# Patient Record
(demographics unavailable — no encounter records)

---

## 2024-10-22 NOTE — HEALTH RISK ASSESSMENT
[Patient reported PAP Smear was abnormal] : Patient reported PAP Smear was abnormal [Never] : Never [None] : None [With Family] : lives with family [Employed] : employed [# Of Children ___] : has [unfilled] children [Feels Safe at Home] : Feels safe at home [Fully functional (bathing, dressing, toileting, transferring, walking, feeding)] : Fully functional (bathing, dressing, toileting, transferring, walking, feeding) [Fully functional (using the telephone, shopping, preparing meals, housekeeping, doing laundry, using] : Fully functional and needs no help or supervision to perform IADLs (using the telephone, shopping, preparing meals, housekeeping, doing laundry, using transportation, managing medications and managing finances) [PapSmearDate] : 01/24

## 2024-10-22 NOTE — PHYSICAL EXAM
[EOMI] : extraocular movements intact [Normal Oropharynx] : the oropharynx was normal [Normal TMs] : both tympanic membranes were normal [No JVD] : no jugular venous distention [No Lymphadenopathy] : no lymphadenopathy [Supple] : supple [Normal] : normal rate, regular rhythm, normal S1 and S2 and no murmur heard [No Edema] : there was no peripheral edema [Soft] : abdomen soft [Non Tender] : non-tender [Non-distended] : non-distended [No CVA Tenderness] : no CVA  tenderness [No Spinal Tenderness] : no spinal tenderness [Grossly Normal Strength/Tone] : grossly normal strength/tone [No Rash] : no rash [Normal Gait] : normal gait [Normal Affect] : the affect was normal [Normal Insight/Judgement] : insight and judgment were intact

## 2024-10-22 NOTE — HISTORY OF PRESENT ILLNESS
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 Pt is 2mpp with her second daughter, currently breastfeeding. Pt had post-partum pre-eclampsia and was hospitalized for 3 days and given magnesium drip. Pt is currently on labetaolol 300mg BID. Pt reports she is doing well. Pt admits to some post-partum depression and is seeing a therapist. Pt denies SI/HI. Pt reports good support system.   Pt denies any CP, palpitations, SOB, PADILLA, fevers, chills, abdominal pain, N/V, diarrhea, constipation, BRBPR or dark tarry stools.

## 2024-10-22 NOTE — PLAN
[FreeTextEntry1] : BW drawn today Declined anti-depressants/anti-anxiety today, encourage to reach out if desired Encourage 150 minutes of physical activity a week Encouraged well balanced diet, low carbohydrates/fatty foods follow up 1 year or sooner if needed

## 2024-11-04 NOTE — DISCUSSION/SUMMARY
[EKG obtained to assist in diagnosis and management of assessed problem(s)] : EKG obtained to assist in diagnosis and management of assessed problem(s) [FreeTextEntry1] : Pt is a 33 y/o F with preeclampsia  HTN: now well controlled c/w labetalol 300mg bid  Discussed the long-term health risks of uncontrolled BP.  Advised low salt diet, regular exercise, maintaining ideal weight. Encouraged pt to check BP at home and keep journal  TTE from hospital normal - as mentioned   Pt will return in 6 mnths or sooner as needed but I encouraged communication via phone or portal if necessary.  We will call pt with test results when applicable and arrange for expedited follow up if necessary.  The described plan was discussed with the pt.  All questions and concerns were addressed to the best of my knowledge.

## 2024-11-04 NOTE — HISTORY OF PRESENT ILLNESS
[FreeTextEntry1] : Pt is a 31 y/o F PMH HLD, preeclampsia.   She delivered baby girl 08/01/2024.  About 3 days after delivery she started to get HAs, checked BP at home which were elevated 160's/100's.  She was admitted to hospital - started on Mg and labetalol was started 200mg bid.    Pt reports feeling well and has no active cardiac complaints - denies CP, SOB, palpitations, dizziness, syncope, edema, orthopnea, PND, orthopnea.  No exertional symptoms. No new hospitalizations, emergency room/urgent care visits, new medical diagnoses, new medications, surgery, or cardiac testing since last OV.  Home 's/80's  TTE 06/2018 EF 60%, min MR/TR TTE 10/2023 EF 56%, trace MR/TR TTE 08/2024 EF 55-60%, mild TR ETT 06/2018 no ischemic changes, 11 METS ETT 10/2023 no ischemic changes, 13.4 METS  PMH: seasonal allergies, migraines Smoking status: never Current exercise: 2-3 times per week - cardio 20 minutes and weight lifting Daily water intake: 3 cups  Daily caffeine intake: none OTC medications: none Family hx: pt denies any immediate family history of cardiac disease, grandfather MI 70

## 2025-02-17 NOTE — HISTORY OF PRESENT ILLNESS
[de-identified] : 32 year old female here for sinus headaches, nasal congestion, sinus pain, PND for many years. Pt reports otalgia, snoring/mouth breathing, loss of sense of smell and taste and muffled hearing, pruritus, intermittent tinnitus, positions dizziness for the past week. Pt states she "does not feel like she can get fresh air through the nose." Patient denies otalgia, otorrhea, ear infections, hearing loss, vertigo.

## 2025-02-17 NOTE — ASSESSMENT
[FreeTextEntry1] : AR/PND.  Start Flonase and Nasal saline rinses. Suspect midface migraines as sinus headache.  Migraine vitamins and Neuro f/u recommended. Seek attention for epistaxis, nasal discharge, facial pain/pressure, fever, chills, headache. Followup 2 months

## 2025-02-17 NOTE — CONSULT LETTER
[Dear  ___] : Dear  [unfilled], [Courtesy Letter:] : I had the pleasure of seeing your patient, [unfilled], in my office today. [Please see my note below.] : Please see my note below. [Sincerely,] : Sincerely, [FreeTextEntry2] : Dr. Anusha Guzman [FreeTextEntry3] : Eulogio Campa MD Otolaryngology at [address], [address] [place] [address] Phone: [phone] Fax: [phone]

## 2025-02-17 NOTE — PHYSICAL EXAM
[de-identified] : dry mucosa, inflammed, sticky allergic clear mucous present [Midline] : trachea located in midline position [de-identified] : pharyngeal mucosal cobblestoning present [Normal] : no neck adenopathy

## 2025-03-11 NOTE — HISTORY OF PRESENT ILLNESS
[FreeTextEntry1] : Patient is a 32-year-old female who presents for a routine annual gynecologic examination.  Patient is 7 months postpartum and is starting to wean from breast-feeding.  Patient has complaints of intermittent episodes of postpartum depression and anxiety.  Patient has had counseling however feels she needs medication at this time.  Discussed this issue with patient recommend treatment with Lexapro 10 mg p.o. daily risk benefits and alternatives reviewed and questions answered.  Patient is agreeable.  Patient also advised to start up counseling and therapy again.  RN gave patient resources.  Patient advised to follow-up in 1 month after starting the Lexapro.  Also discussed lifestyle issues including diet, exercise and rest.  Patient is working full-time.

## 2025-03-11 NOTE — DISCUSSION/SUMMARY
[FreeTextEntry1] : Impression: Postpartum mood disturbance, anxiety, otherwise normal GYN exam, breast-feeding status  Rx: Lexapro 10 mg p.o. daily-instructions and precautions reviewed  Recommendations: Self breast exam, vitamin supplementation regular exercise, arrange for counseling  Follow-up in 1 month 6 months in 1 year

## 2025-04-07 NOTE — PHYSICAL EXAM
[Midline] : trachea located in midline position [Normal] : no neck adenopathy [de-identified] : mild ITH congestion present

## 2025-04-07 NOTE — CONSULT LETTER
[Dear  ___] : Dear  [unfilled], [Courtesy Letter:] : I had the pleasure of seeing your patient, [unfilled], in my office today. [Please see my note below.] : Please see my note below. [Sincerely,] : Sincerely, [FreeTextEntry2] : Dr. Anusha Guzman [FreeTextEntry3] : Eulogio Campa MD Otolaryngology at [address], [address] [place] [address] Phone: [phone] Fax: [phone]

## 2025-04-07 NOTE — HISTORY OF PRESENT ILLNESS
[de-identified] : Update 4/64343: Doing much better on Flonase and rinse.  Only a little congestion and PND.  Headaches also much better.  32 year old female here for sinus headaches, nasal congestion, sinus pain, PND for many years. Pt reports otalgia, snoring/mouth breathing, loss of sense of smell and taste and muffled hearing, pruritus, intermittent tinnitus, positions dizziness for the past week. Pt states she "does not feel like she can get fresh air through the nose." Patient denies otalgia, otorrhea, ear infections, hearing loss, vertigo.

## 2025-04-07 NOTE — ASSESSMENT
[FreeTextEntry1] : AR/PND.  Continue Flonase and Nasal saline rinses. Add Astelin. Migraine vitamins and Neuro f/u recommended. Seek attention for epistaxis, nasal discharge, facial pain/pressure, fever, chills, headache. Followup 3 months

## 2025-05-02 NOTE — REVIEW OF SYSTEMS
[Negative] : Heme/Lymph [SOB] : no shortness of breath [Dyspnea on exertion] : not dyspnea during exertion [Lower Ext Edema] : no extremity edema [Chest Discomfort] : no chest discomfort [Leg Claudication] : no intermittent leg claudication [Palpitations] : no palpitations [Orthopnea] : no orthopnea [PND] : no PND [Syncope] : no syncope

## 2025-05-02 NOTE — DISCUSSION/SUMMARY
[EKG obtained to assist in diagnosis and management of assessed problem(s)] : EKG obtained to assist in diagnosis and management of assessed problem(s) [FreeTextEntry1] : Pt is a 31 y/o F with HTN  HTN: elevated recently increase labetalol to 400mg bid  Discussed the long-term health risks of uncontrolled BP.  Advised low salt diet, regular exercise, maintaining ideal weight. Encouraged pt to check BP at home and keep journal  TTE from hospital normal - as mentioned   Pt will return in 6 mnths or sooner as needed but I encouraged communication via phone or portal if necessary.  We will call pt with test results when applicable and arrange for expedited follow up if necessary.  The described plan was discussed with the pt.  All questions and concerns were addressed to the best of my knowledge.

## 2025-05-02 NOTE — HISTORY OF PRESENT ILLNESS
[FreeTextEntry1] : Pt is a 31 y/o F PMH HLD, preeclampsia.   She delivered baby girl 08/01/2024.  About 3 days after delivery she started to get HAs, checked BP at home which were elevated 160's/100's.  She was admitted to hospital - started on Mg and labetalol was started 200mg bid.    Pt reports feeling well and has no active cardiac complaints - denies CP, SOB, palpitations, dizziness, syncope, edema, orthopnea, PND, orthopnea.  No exertional symptoms. No new hospitalizations, emergency room/urgent care visits, surgery, or cardiac testing since last OV.  She notes post partum depression and has been started on lexapro which is helping Home 's/90's  TTE 06/2018 EF 60%, min MR/TR TTE 10/2023 EF 56%, trace MR/TR TTE 08/2024 EF 55-60%, mild TR ETT 06/2018 no ischemic changes, 11 METS ETT 10/2023 no ischemic changes, 13.4 METS  additional PMH: seasonal allergies, migraines Smoking status: never Current exercise: 2-3 times per week - cardio 20 minutes and weight lifting Daily water intake: 3 cups  Daily caffeine intake: none OTC medications: none Family hx: pt denies any immediate family history of cardiac disease, grandfather MI 70

## 2025-05-14 NOTE — HISTORY OF PRESENT ILLNESS
[de-identified] : Update 5/14/2025: 32 year old female here for f/u sinusitis. Previously started on Astelin in addition to Flonase and saline rinses. Had severe congestion requiring urgent care during this bad allergy week.  Update 4/72025: Doing much better on Flonase and rinse.  Only a little congestion and PND.  Headaches also much better.  32 year old female here for sinus headaches, nasal congestion, sinus pain, PND for many years. Pt reports otalgia, snoring/mouth breathing, loss of sense of smell and taste and muffled hearing, pruritus, intermittent tinnitus, positions dizziness for the past week. Pt states she "does not feel like she can get fresh air through the nose." Patient denies otalgia, otorrhea, ear infections, hearing loss, vertigo.

## 2025-05-14 NOTE — PHYSICAL EXAM
[Midline] : trachea located in midline position [Normal] : no neck adenopathy [de-identified] : mild ITH congestion present

## 2025-05-14 NOTE — DISCUSSION/SUMMARY
[FreeTextEntry1] : Impression: Postpartum mood disturbance  Continue Lexapro 10 mg  Instructions and precautions reviewed follow-up in 3 months

## 2025-05-14 NOTE — ASSESSMENT
[FreeTextEntry1] : AR/PND.  Continue Flonase, astelin  and Nasal saline rinses. Add Singulair. Migraine vitamins and Neuro f/u recommended. Seek attention for epistaxis, nasal discharge, facial pain/pressure, fever, chills, headache. Followup 3 months

## 2025-05-14 NOTE — HISTORY OF PRESENT ILLNESS
[FreeTextEntry1] : Patient is a 32-year-old female who is seen approximately 1 month ago at 7 months for an annual exam and had complaints of significant mood alteration decreased energy.  Discussed this issue with patient patient was started on Lexapro 10 mg and was also advised concerning lifestyle changes including diet and exercise and stress reduction.  Patient states she is feeling significantly improved with the medication and would like to continue.  Patient also encouraged to take vitamin supplementation.  Risk benefits and alternatives reviewed and questions answered.  Instructions and precautions discussed.  Patient to follow-up in 3 months.

## 2025-05-14 NOTE — CONSULT LETTER
[Dear  ___] : Dear  [unfilled], [Courtesy Letter:] : I had the pleasure of seeing your patient, [unfilled], in my office today. [Please see my note below.] : Please see my note below. [Sincerely,] : Sincerely, [FreeTextEntry2] : Dr. Anusha Guzman [FreeTextEntry3] : Eulogio Campa MD Otolaryngology at [address], [address] [place] [address] Phone: [phone] Fax: [phone]